# Patient Record
Sex: FEMALE | Race: WHITE | NOT HISPANIC OR LATINO | URBAN - METROPOLITAN AREA
[De-identification: names, ages, dates, MRNs, and addresses within clinical notes are randomized per-mention and may not be internally consistent; named-entity substitution may affect disease eponyms.]

---

## 2017-01-16 ENCOUNTER — OFFICE (OUTPATIENT)
Dept: URBAN - METROPOLITAN AREA CLINIC 101 | Facility: CLINIC | Age: 82
End: 2017-01-16

## 2017-01-16 VITALS
HEART RATE: 75 BPM | DIASTOLIC BLOOD PRESSURE: 87 MMHG | TEMPERATURE: 97.9 F | WEIGHT: 280 LBS | HEIGHT: 65 IN | SYSTOLIC BLOOD PRESSURE: 177 MMHG

## 2017-01-16 DIAGNOSIS — R03.0 ELEVATED BLOOD-PRESSURE READING, WITHOUT DIAGNOSIS OF HYPERT: ICD-10-CM

## 2017-01-16 DIAGNOSIS — R68.89 OTHER GENERAL SYMPTOMS AND SIGNS: ICD-10-CM

## 2017-01-16 DIAGNOSIS — Z87.11 PERSONAL HISTORY OF PEPTIC ULCER DISEASE: ICD-10-CM

## 2017-01-16 DIAGNOSIS — R10.13 EPIGASTRIC PAIN: ICD-10-CM

## 2017-01-16 PROCEDURE — 99214 OFFICE O/P EST MOD 30 MIN: CPT

## 2017-01-16 NOTE — SERVICEHPINOTES
Ms. Solorzano is here to discuss abdominal pain. 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 In 2012, she was dx with a bleeding GI ulcer and was hospitalized for this at that time. She was on an NSAID at that time. She stopped her PPI almost one year ago as she was worried about bone loss. She is now on a PPI once a day but still has pain in the am. Her pain is epigastric and in the RUQ. No black stool this time around. No blood in stool. No coffee ground emesis since 2012. Takes Aleve on occasion. No heart issues. No blood clots since 2000.

## 2017-01-24 ENCOUNTER — ON CAMPUS - OUTPATIENT (OUTPATIENT)
Dept: URBAN - METROPOLITAN AREA HOSPITAL 35 | Facility: HOSPITAL | Age: 82
End: 2017-01-24
Payer: COMMERCIAL

## 2017-01-24 DIAGNOSIS — K25.7 CHRONIC GASTRIC ULCER WITHOUT HEMORRHAGE OR PERFORATION: ICD-10-CM

## 2017-01-24 DIAGNOSIS — K21.9 GASTRO-ESOPHAGEAL REFLUX DISEASE WITHOUT ESOPHAGITIS: ICD-10-CM

## 2017-01-24 DIAGNOSIS — K29.60 OTHER GASTRITIS WITHOUT BLEEDING: ICD-10-CM

## 2017-01-24 DIAGNOSIS — R10.13 EPIGASTRIC PAIN: ICD-10-CM

## 2017-01-24 PROCEDURE — 43239 EGD BIOPSY SINGLE/MULTIPLE: CPT

## 2019-04-30 ENCOUNTER — OFFICE (OUTPATIENT)
Dept: URBAN - METROPOLITAN AREA CLINIC 101 | Facility: CLINIC | Age: 84
End: 2019-04-30
Payer: COMMERCIAL

## 2019-04-30 VITALS
TEMPERATURE: 97.7 F | HEIGHT: 65 IN | SYSTOLIC BLOOD PRESSURE: 136 MMHG | DIASTOLIC BLOOD PRESSURE: 67 MMHG | WEIGHT: 278 LBS | HEART RATE: 67 BPM

## 2019-04-30 DIAGNOSIS — R10.13 EPIGASTRIC PAIN: ICD-10-CM

## 2019-04-30 DIAGNOSIS — E11.9 TYPE 2 DIABETES MELLITUS WITHOUT COMPLICATIONS: ICD-10-CM

## 2019-04-30 DIAGNOSIS — N18.4 CHRONIC KIDNEY DISEASE, STAGE 4 (SEVERE): ICD-10-CM

## 2019-04-30 DIAGNOSIS — Z87.11 PERSONAL HISTORY OF PEPTIC ULCER DISEASE: ICD-10-CM

## 2019-04-30 PROCEDURE — 99214 OFFICE O/P EST MOD 30 MIN: CPT

## 2019-04-30 NOTE — SERVICEHPINOTES
KAYLA STUBBS   is a   83  female with h/o CKD IV, DMT2, HTN, HLD who complains of stomach pain that began 03/03/19. She reports RUQ pain under rib cage that wraps around her back, described as "pressure" at baseline with increased pain rated 8/10, occurs daily, lasting few hours, worse with po intake, and asso sx include belching: No n/v, dysphagia, melena. She was seen by PCP for these symptoms who advised to continue same dose of PPI pantoprazole 40 mg qd. No NSAID use. She states no cardiac disease. No blood clots since 2000. H/o bleeding GI ulcer in 2012, which required hospitalization for blood transfusion and related to NSAID use at that time. Denies fevers, n/v, lower abdominal pain, diarrhea, melena, rectal bleeding, weight loss. Reviewed recent 04/09/19 RUQ u/s ordered by PCP showing evidence of hepatomegaly at 22.3 cm no liver mass/no bile duct dilation right renal disease."  BR

## 2019-05-01 ENCOUNTER — ON CAMPUS - OUTPATIENT (OUTPATIENT)
Dept: URBAN - METROPOLITAN AREA HOSPITAL 35 | Facility: HOSPITAL | Age: 84
End: 2019-05-01
Payer: COMMERCIAL

## 2019-05-01 DIAGNOSIS — R10.13 EPIGASTRIC PAIN: ICD-10-CM

## 2019-05-01 DIAGNOSIS — K31.7 POLYP OF STOMACH AND DUODENUM: ICD-10-CM

## 2019-05-01 DIAGNOSIS — K29.80 DUODENITIS WITHOUT BLEEDING: ICD-10-CM

## 2019-05-01 DIAGNOSIS — K29.60 OTHER GASTRITIS WITHOUT BLEEDING: ICD-10-CM

## 2019-05-01 PROCEDURE — 43239 EGD BIOPSY SINGLE/MULTIPLE: CPT
